# Patient Record
Sex: FEMALE | Race: WHITE | NOT HISPANIC OR LATINO | Employment: PART TIME | URBAN - METROPOLITAN AREA
[De-identification: names, ages, dates, MRNs, and addresses within clinical notes are randomized per-mention and may not be internally consistent; named-entity substitution may affect disease eponyms.]

---
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
## 2020-01-04 ENCOUNTER — TELEPHONE (OUTPATIENT)
Dept: PEDIATRICS CLINIC | Facility: CLINIC | Age: 20
End: 2020-01-04

## 2020-01-04 DIAGNOSIS — F41.1 GENERALIZED ANXIETY DISORDER: ICD-10-CM

## 2020-01-04 DIAGNOSIS — F41.0 PANIC DISORDER WITHOUT AGORAPHOBIA: ICD-10-CM

## 2020-01-04 DIAGNOSIS — F41.9 ANXIETY: ICD-10-CM

## 2020-01-04 NOTE — TELEPHONE ENCOUNTER
Mom called and Elkin Coley needs a refill on both prescriptions  Generic for Zoloft and Quantisine  #[phone]

## 2020-01-06 ENCOUNTER — TELEPHONE (OUTPATIENT)
Dept: PEDIATRICS CLINIC | Facility: CLINIC | Age: 20
End: 2020-01-06

## 2020-01-06 RX ORDER — GUANFACINE 2 MG/1
1 TABLET, EXTENDED RELEASE ORAL
Qty: 90 TABLET | Refills: 0 | Status: SHIPPED | OUTPATIENT
Start: 2020-01-06 | End: 2020-03-30

## 2020-01-10 DIAGNOSIS — N94.6 DYSMENORRHEA: Primary | ICD-10-CM

## 2020-01-13 ENCOUNTER — OFFICE VISIT (OUTPATIENT)
Dept: PEDIATRICS CLINIC | Facility: CLINIC | Age: 20
End: 2020-01-13
Payer: COMMERCIAL

## 2020-01-13 VITALS
SYSTOLIC BLOOD PRESSURE: 106 MMHG | BODY MASS INDEX: 22.68 KG/M2 | TEMPERATURE: 98.2 F | HEIGHT: 63 IN | WEIGHT: 128 LBS | DIASTOLIC BLOOD PRESSURE: 70 MMHG

## 2020-01-13 DIAGNOSIS — R50.9 FEVER, UNSPECIFIED FEVER CAUSE: Primary | ICD-10-CM

## 2020-01-13 DIAGNOSIS — R68.89 FLU-LIKE SYMPTOMS: ICD-10-CM

## 2020-01-13 LAB — S PYO AG THROAT QL: NEGATIVE

## 2020-01-13 PROCEDURE — 3008F BODY MASS INDEX DOCD: CPT | Performed by: PEDIATRICS

## 2020-01-13 PROCEDURE — 87880 STREP A ASSAY W/OPTIC: CPT | Performed by: PEDIATRICS

## 2020-01-13 PROCEDURE — 1036F TOBACCO NON-USER: CPT | Performed by: PEDIATRICS

## 2020-01-13 PROCEDURE — 99213 OFFICE O/P EST LOW 20 MIN: CPT | Performed by: PEDIATRICS

## 2020-01-13 RX ORDER — NORETHINDRONE ACETATE AND ETHINYL ESTRADIOL AND FERROUS FUMARATE 1MG-20(21)
KIT ORAL
COMMUNITY
Start: 2020-01-13

## 2020-01-13 RX ORDER — NORETHINDRONE ACETATE AND ETHINYL ESTRADIOL AND FERROUS FUMARATE 1MG-20(21)
KIT ORAL
Qty: 84 TABLET | Refills: 1 | Status: SHIPPED | OUTPATIENT
Start: 2020-01-13 | End: 2020-10-30 | Stop reason: SDUPTHER

## 2020-01-13 RX ORDER — CEFUROXIME AXETIL 250 MG/1
TABLET ORAL
COMMUNITY
Start: 2019-10-24 | End: 2020-02-28 | Stop reason: ALTCHOICE

## 2020-01-13 NOTE — PROGRESS NOTES
Assessment/Plan:    poc strep negative, culture pending  Informed Crispin Vital that she likely has viral flu  To monitor, push fluids, rest, motrin and tylenol  She does not return to school until the end of the month  She will call or return to office if not improving  Crispin Vital verbalized understanding     Diagnoses and all orders for this visit:    Fever, unspecified fever cause  -     POCT rapid strepA  -     Cancel: Throat culture; Future  -     Throat culture    Flu-like symptoms    Other orders  -     cefuroxime (CEFTIN) 250 mg tablet  -     JUNEL FE 1/20 1-20 MG-MCG per tablet          Subjective:      Patient ID: Ashely Galarza is a 23 y o  female  Saturday achy body, fevers 103, still persisting today  Fatigue, little congestion  She is worried that she has the flu  The following portions of the patient's history were reviewed and updated as appropriate: allergies, current medications, past family history, past medical history, past social history, past surgical history and problem list     Review of Systems      Objective:      /70 (BP Location: Left arm, Patient Position: Sitting, Cuff Size: Adult)   Temp 98 2 °F (36 8 °C) (Tympanic)   Ht 5' 2 5" (1 588 m)   Wt 58 1 kg (128 lb)   BMI 23 04 kg/m²          Physical Exam   Constitutional: She is oriented to person, place, and time  She appears well-developed and well-nourished  HENT:   Right Ear: External ear normal    Left Ear: External ear normal    Mouth/Throat: Posterior oropharyngeal erythema present  No oropharyngeal exudate or posterior oropharyngeal edema  Eyes: Pupils are equal, round, and reactive to light  Conjunctivae and EOM are normal    Neck: Normal range of motion  Cardiovascular: Normal rate and regular rhythm  Pulmonary/Chest: Effort normal and breath sounds normal    Abdominal: Soft  Bowel sounds are normal    Musculoskeletal: Normal range of motion  Lymphadenopathy:     She has cervical adenopathy     Neurological: She is alert and oriented to person, place, and time  Skin: Skin is warm and dry  Capillary refill takes less than 2 seconds  Psychiatric: She has a normal mood and affect  Her behavior is normal  Judgment and thought content normal    Nursing note and vitals reviewed

## 2020-01-13 NOTE — LETTER
January 13, 2020     Patient: Elliot Ortiz   YOB: 2000   Date of Visit: 1/13/2020       To Whom it May Concern:    Carrillo Gary is under my professional care  She was seen in my office on 1/13/2020  She has been diagnosed with flu and will be absent from work until she is fever free  If you have any questions or concerns, please don't hesitate to call           Sincerely,          LUI Forman        CC: No Recipients

## 2020-01-15 ENCOUNTER — TELEPHONE (OUTPATIENT)
Dept: OTHER | Facility: OTHER | Age: 20
End: 2020-01-15

## 2020-01-15 LAB — B-HEM STREP SPEC QL CULT: NEGATIVE

## 2020-01-16 NOTE — TELEPHONE ENCOUNTER
Keysha Appl 2000  CONFIDENTIALTY NOTICE: This fax transmission is intended only for the addressee  It contains information that is legally privileged,  confidential or otherwise protected from use or disclosure  If you are not the intended recipient, you are strictly prohibited from reviewing,  disclosing, copying using or disseminating any of this information or taking any action in reliance on or regarding this information  If you have  received this fax in error, please notify us immediately by telephone so that we can arrange for its return to us  Page: 1 of 3  Call Id: [id number]  Health Call  Standard Call Report  Health Call  Patient Name: Keysha Guthrie  Gender: Female  : 2000  Age: 23 Y 6 M 24 D  Return Phone  Number: [phone] (Current)  Address: [address] [place] [address]   City/State/Zip: [facility]  Practice Name: Mich[facility]  Practice Charged:  Physician:  [facility] Name: Angeles Fuentes  Relationship To  Patient: Mother  Return Phone Number: [phone] (Current)  Presenting Problem: "My daughter has the flu and she has a  temp if 103 7 oral "  Service Type: Triage  Charged Service 1: N/A  Pharmacy Name and  Number:  Nurse Assessment  Nurse: Faby Man RN Date/Time: 1/15/2020 7:19:58 PM  Type of assessment required:  ---General (Adult or Child)  Duration of Current S/S  ---Since Saturday  Location/Radiation  ---Nose  Temperature (F) and route:  ---103 2 (oral)  Symptom Specific Meds (Dose/Time): Will take Tylenol right now   ---ibuprofen 2 tablets LD: 1900  Other S/S  ---Ongoing moderately high fevers since Saturday  Body aches and a runny nose  No  cough  No difficulty breathing  Is concerned that fevers have been present for this long    Pain Scale on scale of 1-10, 10 being the worst:  ---No pain  Symptom progression:  ---same  Intake and Output  ---Drinking lots of fluids / Urinating normally  Keysha Appl 2000  CONFIDENTIALTY NOTICE: This fax transmission is intended only for the addressee  It contains information that is legally privileged,  confidential or otherwise protected from use or disclosure  If you are not the intended recipient, you are strictly prohibited from reviewing,  disclosing, copying using or disseminating any of this information or taking any action in reliance on or regarding this information  If you have  received this fax in error, please notify us immediately by telephone so that we can arrange for its return to us  Page: 2 of 3  Call Id: [id number]  Nurse Assessment  LMP/ Pregnancy:  ---n/a  Breastfeeding  ---No  Last Exam/Treatment:  ---In the office on Monday and diagnosed with the Flu  Protocols  Protocol Title Nurse Date/Time  Influenza (Flu) - Seasonal DEONTE Castillo, Tim Pouch 1/15/2020 7:23:47 PM  Question Caller Affirmed  Disp  Time Disposition Final User  1/15/2020 7:34:02 PM See Physician within [facility] Rashida Alan RN, Tim Jung  1/15/2020 7:35:02 PM RN Triaged Yes Arjun Azul RN, [facility] Advice Given Per Protocol  SEE PHYSICIAN WITHIN 24 HOURS: * IF OFFICE WILL BE OPEN: Your child needs to be examined within the next 24 hours  Call your child's doctor when the office opens, and make an appointment  FEVER MEDICINE AND TREATMENT: * For fever above  102 F (39 C) or aches, use acetaminophen OR ibuprofen (See Dosage table)  * AVOID ASPIRIN because of the strong link with Reye  syndrome  * FOR ALL FEVERS: Give cool fluids in unlimited amounts (Exception: less than 6 months old)  Dress in 1 layer of lightweight  clothing and sleep with 1 light blanket  (Avoid bundling)  Reason: overheated infants can't undress themselves  For fevers 100-102  F (37 8 to 39 C), this is the only treatment needed  Fever medicines are unnecessary  NASAL SALINE TO OPEN A BLOCKED NOSE:  * Use saline (salt water) nose drops or spray to loosen up the dried mucus  If you don't have saline, you can use a few drops of bottled  water or clean tap water   (If under 3year old, use bottled water or boiled tap water ) * STEP 1: Put 3 drops in each nostril  (Age under 3 year old, use 1 drop ) * STEP 2: Blow (or suction) each nostril separately, while closing off the other nostril  Then do other side  * STEP  3: Repeat nose drops and blowing (or suctioning) until the discharge is clear  * How Often: Do nasal saline when your child can't breathe  through the nose  Limit: If under 3year old, no more than 4 times per day or before every feeding  * Saline nose drops or spray can be  bought in any drugstore  No prescription is needed  * Saline nose drops can also be made at home  Use 1/2 teaspoon (2 ml) of table salt  Stir the salt into 1 cup (8 ounces or 240 ml) of warm water  Use bottled water or boiled water to make saline nose drops  * Reason for  nose drops: Suction or blowing alone can't remove dried or sticky mucus  Also, babies can't nurse or drink from a bottle unless the nose is  open  * Other option: use a warm shower to loosen mucus  Breathe in the moist air, then blow (or suction) each nostril  HUMIDIFIER: *  If the air in your home is dry, use a humidifier  * Moist air keeps the nasal mucus from drying up  CALL BACK IF * Rapid or difficulty  breathing develops * Your child becomes worse CARE ADVICE given per Influenza (Flu) - Seasonal (Pediatric) guideline  Caller Understands: Yes  Caller Disagree/Comply: Comply  PreDisposition: Unsure  Comments  User: Alba Skinner RN Date/Time: 1/15/2020 7:34:34 PM  Carrillo Gary 2000  CONFIDENTIALTY NOTICE: This fax transmission is intended only for the addressee  It contains information that is legally privileged,  confidential or otherwise protected from use or disclosure  If you are not the intended recipient, you are strictly prohibited from reviewing,  disclosing, copying using or disseminating any of this information or taking any action in reliance on or regarding this information   If you have  received this fax in error, please notify us immediately by telephone so that we can arrange for its return to us  Page: 3 of 3  Call Id: [id number]  Comments  Spoke to patient and with mom  Patient gave consent to speak to her mother Beula Clonts when needed  User: Germán Wu RN Date/Time: 1/15/2020 7:34:57 PM  Prefers calling the office tomorrow to schedule an appointment

## 2020-02-04 ENCOUNTER — TELEPHONE (OUTPATIENT)
Dept: PEDIATRICS CLINIC | Facility: CLINIC | Age: 20
End: 2020-02-04

## 2020-02-04 NOTE — TELEPHONE ENCOUNTER
The message was that last night Antione was throwing up and having anxiety and panic attacks she is complaining of chest pain I called at 1:50 p m  And left a message that I was returning the call

## 2020-02-28 ENCOUNTER — OFFICE VISIT (OUTPATIENT)
Dept: PEDIATRICS CLINIC | Facility: CLINIC | Age: 20
End: 2020-02-28
Payer: COMMERCIAL

## 2020-02-28 VITALS
DIASTOLIC BLOOD PRESSURE: 68 MMHG | WEIGHT: 122.6 LBS | HEART RATE: 68 BPM | BODY MASS INDEX: 22.56 KG/M2 | TEMPERATURE: 101 F | RESPIRATION RATE: 18 BRPM | HEIGHT: 62 IN | SYSTOLIC BLOOD PRESSURE: 112 MMHG

## 2020-02-28 DIAGNOSIS — J01.00 ACUTE NON-RECURRENT MAXILLARY SINUSITIS: Primary | ICD-10-CM

## 2020-02-28 PROCEDURE — 1036F TOBACCO NON-USER: CPT | Performed by: NURSE PRACTITIONER

## 2020-02-28 PROCEDURE — 99214 OFFICE O/P EST MOD 30 MIN: CPT | Performed by: NURSE PRACTITIONER

## 2020-02-28 PROCEDURE — 3008F BODY MASS INDEX DOCD: CPT | Performed by: NURSE PRACTITIONER

## 2020-02-28 RX ORDER — AMOXICILLIN 875 MG/1
875 TABLET, COATED ORAL 2 TIMES DAILY
Qty: 20 TABLET | Refills: 0 | Status: SHIPPED | OUTPATIENT
Start: 2020-02-28 | End: 2020-03-09

## 2020-02-28 NOTE — PROGRESS NOTES
Assessment/Plan:    No problem-specific Assessment & Plan notes found for this encounter  Diagnoses and all orders for this visit:    Acute non-recurrent maxillary sinusitis  -     amoxicillin (AMOXIL) 875 mg tablet; Take 1 tablet (875 mg total) by mouth 2 (two) times a day for 10 days      discussed with patient that based on symptoms and exam she is most likely suffering from a sinus infection and will treat her with amoxicillin  Should also try Flonase nasal spray 1 spray in each nostril once daily, saline nasal spray few times per day, and run cool mist humidifier at night while she is sleeping  May use ibuprofen or acetaminophen as needed for discomfort  Encouraged her to drink plenty of fluids  If symptoms worsen or do not improve by next week, call office for possible follow-up appointment  Patient verbalized understanding  Subjective:      Patient ID: Thomas Tolbert is a 21 y o  female  Started Tuesday with cold symptoms, worsening congestion last night, last dose of advil at 10pm, fever this morning, tmax 101F, lots of post-nasal drip, no cough, no GI symptoms noted, eating and drinking okay, not sleeping well, no other symptoms noted, attends [employer] [facility]    Sinus Problem   Associated symptoms include congestion, headaches and sinus pressure  Fever   Associated symptoms include congestion, fatigue and headaches  Pertinent negatives include no fever  Headache    Associated symptoms include sinus pressure  Pertinent negatives include no fever  The following portions of the patient's history were reviewed and updated as appropriate: allergies, current medications, past family history, past medical history, past social history, past surgical history and problem list     Review of Systems   Constitutional: Positive for activity change and fatigue  Negative for fever  HENT: Positive for congestion, sinus pressure and sinus pain  Respiratory: Negative      Cardiovascular: Negative  Gastrointestinal: Negative  Neurological: Positive for headaches  Objective:      /68 (BP Location: Left arm, Patient Position: Sitting, Cuff Size: Adult)   Pulse 68   Temp (!) 101 °F (38 3 °C) (Tympanic)   Resp 18   Ht 5' 2" (1 575 m)   Wt 55 6 kg (122 lb 9 6 oz)   BMI 22 42 kg/m²          Physical Exam   Constitutional: Vital signs are normal  She appears well-developed and well-nourished  She is cooperative  HENT:   Head: Normocephalic and atraumatic  Right Ear: Hearing, tympanic membrane, external ear and ear canal normal    Left Ear: Hearing, tympanic membrane, external ear and ear canal normal    Nose: Sinus tenderness present  Right sinus exhibits maxillary sinus tenderness  Left sinus exhibits maxillary sinus tenderness  Mouth/Throat: Oropharynx is clear and moist    Moderate nasal congestion noted   Neck: Normal range of motion  Neck supple  Cardiovascular: Normal rate, regular rhythm, S1 normal, S2 normal and normal heart sounds  Pulmonary/Chest: Effort normal and breath sounds normal    Neurological: She is alert  Skin: Skin is warm  Capillary refill takes less than 2 seconds  Nursing note and vitals reviewed

## 2020-03-28 DIAGNOSIS — F41.9 ANXIETY: ICD-10-CM

## 2020-03-28 DIAGNOSIS — F41.1 GENERALIZED ANXIETY DISORDER: ICD-10-CM

## 2020-03-28 DIAGNOSIS — F41.0 PANIC DISORDER WITHOUT AGORAPHOBIA: ICD-10-CM

## 2020-03-30 RX ORDER — GUANFACINE 2 MG/1
1 TABLET, EXTENDED RELEASE ORAL
Qty: 90 TABLET | Refills: 0 | Status: SHIPPED | OUTPATIENT
Start: 2020-03-30 | End: 2020-05-11 | Stop reason: SDUPTHER

## 2020-05-11 ENCOUNTER — OFFICE VISIT (OUTPATIENT)
Dept: PEDIATRICS CLINIC | Facility: CLINIC | Age: 20
End: 2020-05-11
Payer: COMMERCIAL

## 2020-05-11 VITALS
TEMPERATURE: 97.8 F | BODY MASS INDEX: 22.42 KG/M2 | HEART RATE: 66 BPM | RESPIRATION RATE: 16 BRPM | SYSTOLIC BLOOD PRESSURE: 120 MMHG | HEIGHT: 62 IN | DIASTOLIC BLOOD PRESSURE: 68 MMHG

## 2020-05-11 DIAGNOSIS — Z13.31 ENCOUNTER FOR SCREENING FOR DEPRESSION: ICD-10-CM

## 2020-05-11 DIAGNOSIS — F41.1 GENERALIZED ANXIETY DISORDER: ICD-10-CM

## 2020-05-11 DIAGNOSIS — F41.0 PANIC DISORDER WITHOUT AGORAPHOBIA: ICD-10-CM

## 2020-05-11 PROCEDURE — 99214 OFFICE O/P EST MOD 30 MIN: CPT | Performed by: PEDIATRICS

## 2020-05-11 PROCEDURE — 96168 HLTH BHV IVNTJ FAM EA ADDL: CPT | Performed by: PEDIATRICS

## 2020-05-11 PROCEDURE — 96156 HLTH BHV ASSMT/REASSESSMENT: CPT | Performed by: PEDIATRICS

## 2020-05-11 PROCEDURE — 1036F TOBACCO NON-USER: CPT | Performed by: PEDIATRICS

## 2020-05-11 RX ORDER — GUANFACINE 2 MG/1
1 TABLET, EXTENDED RELEASE ORAL
Qty: 90 TABLET | Refills: 0 | Status: SHIPPED | OUTPATIENT
Start: 2020-05-11 | End: 2020-08-19 | Stop reason: ALTCHOICE

## 2020-06-15 ENCOUNTER — TELEPHONE (OUTPATIENT)
Dept: PEDIATRICS CLINIC | Facility: CLINIC | Age: 20
End: 2020-06-15

## 2020-06-15 DIAGNOSIS — N94.6 DYSMENORRHEA: ICD-10-CM

## 2020-06-15 DIAGNOSIS — N94.6 DYSMENORRHEA: Primary | ICD-10-CM

## 2020-06-15 RX ORDER — NORETHINDRONE ACETATE AND ETHINYL ESTRADIOL AND FERROUS FUMARATE 1MG-20(21)
KIT ORAL
Qty: 84 TABLET | Refills: 1 | OUTPATIENT
Start: 2020-06-15

## 2020-06-15 RX ORDER — NORETHINDRONE ACETATE AND ETHINYL ESTRADIOL 1MG-20(21)
1 KIT ORAL DAILY
Qty: 28 TABLET | Refills: 1 | Status: SHIPPED | OUTPATIENT
Start: 2020-06-15 | End: 2020-07-14

## 2020-07-01 ENCOUNTER — TELEPHONE (OUTPATIENT)
Dept: PEDIATRICS CLINIC | Facility: CLINIC | Age: 20
End: 2020-07-01

## 2020-07-01 DIAGNOSIS — F41.0 PANIC DISORDER WITHOUT AGORAPHOBIA: ICD-10-CM

## 2020-07-01 DIAGNOSIS — F41.1 GENERALIZED ANXIETY DISORDER: ICD-10-CM

## 2020-07-01 NOTE — TELEPHONE ENCOUNTER
Marilee Dawn needs refills on 2 medications:    Zoloft 50 mg  Quanfacine 2 mg  Please send to [facility]  Mom advised will be done tomorrow

## 2020-07-02 DIAGNOSIS — F41.0 PANIC DISORDER WITHOUT AGORAPHOBIA: ICD-10-CM

## 2020-07-02 DIAGNOSIS — F41.1 GENERALIZED ANXIETY DISORDER: ICD-10-CM

## 2020-07-02 RX ORDER — GUANFACINE 2 MG/1
1 TABLET, EXTENDED RELEASE ORAL
Qty: 30 TABLET | Refills: 0 | Status: SHIPPED | OUTPATIENT
Start: 2020-07-02 | End: 2020-10-19

## 2020-07-02 NOTE — TELEPHONE ENCOUNTER
Notified mom that prescription was sent  Will reschedule appointment so that it will be scheduled with me for appropriate follow up

## 2020-07-08 DIAGNOSIS — N94.6 DYSMENORRHEA: ICD-10-CM

## 2020-07-14 DIAGNOSIS — N94.6 DYSMENORRHEA: ICD-10-CM

## 2020-07-14 RX ORDER — NORETHINDRONE ACETATE AND ETHINYL ESTRADIOL AND FERROUS FUMARATE 1MG-20(21)
KIT ORAL
Qty: 84 TABLET | Refills: 1 | OUTPATIENT
Start: 2020-07-14

## 2020-07-14 RX ORDER — NORETHINDRONE ACETATE AND ETHINYL ESTRADIOL AND FERROUS FUMARATE 1MG-20(21)
KIT ORAL
Qty: 28 TABLET | Refills: 0 | Status: SHIPPED | OUTPATIENT
Start: 2020-07-14 | End: 2020-10-30 | Stop reason: SDUPTHER

## 2020-07-27 ENCOUNTER — TELEPHONE (OUTPATIENT)
Dept: PEDIATRICS CLINIC | Facility: CLINIC | Age: 20
End: 2020-07-27

## 2020-07-27 DIAGNOSIS — N94.6 DYSMENORRHEA: Primary | ICD-10-CM

## 2020-07-27 RX ORDER — NORETHINDRONE ACETATE AND ETHINYL ESTRADIOL 1MG-20(21)
1 KIT ORAL DAILY
Qty: 28 TABLET | Refills: 0 | Status: SHIPPED | OUTPATIENT
Start: 2020-07-27 | End: 2020-10-30 | Stop reason: SDUPTHER

## 2020-07-27 NOTE — TELEPHONE ENCOUNTER
Patient has appointment in early August so 1 more prescription has been sent  Will need to have follow-up prior to further prescriptions being sent

## 2020-07-27 NOTE — TELEPHONE ENCOUNTER
[facility] needs a refill for her Junel FE Please send to [facility], [employer]

## 2020-08-04 ENCOUNTER — TELEPHONE (OUTPATIENT)
Dept: PEDIATRICS CLINIC | Facility: CLINIC | Age: 20
End: 2020-08-04

## 2020-08-04 DIAGNOSIS — N94.6 DYSMENORRHEA: Primary | ICD-10-CM

## 2020-08-04 RX ORDER — NORETHINDRONE ACETATE AND ETHINYL ESTRADIOL 1MG-20(21)
1 KIT ORAL DAILY
Qty: 28 TABLET | Refills: 0 | Status: SHIPPED | OUTPATIENT
Start: 2020-08-04 | End: 2020-10-30 | Stop reason: ALTCHOICE
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
## 2020-08-04 NOTE — TELEPHONE ENCOUNTER
Prescription for 1 month was sent  Patient needs to schedule a full gyn visit  Will need to be seen prior to next refill

## 2020-08-07 ENCOUNTER — OFFICE VISIT (OUTPATIENT)
Dept: PEDIATRICS CLINIC | Facility: CLINIC | Age: 20
End: 2020-08-07
Payer: COMMERCIAL

## 2020-08-07 VITALS
TEMPERATURE: 97.1 F | WEIGHT: 130 LBS | SYSTOLIC BLOOD PRESSURE: 110 MMHG | BODY MASS INDEX: 23.92 KG/M2 | HEIGHT: 62 IN | DIASTOLIC BLOOD PRESSURE: 64 MMHG

## 2020-08-07 DIAGNOSIS — F41.0 PANIC DISORDER: ICD-10-CM

## 2020-08-07 DIAGNOSIS — F41.1 GENERALIZED ANXIETY DISORDER: Primary | ICD-10-CM

## 2020-08-07 PROCEDURE — 1036F TOBACCO NON-USER: CPT | Performed by: PEDIATRICS

## 2020-08-07 PROCEDURE — 99214 OFFICE O/P EST MOD 30 MIN: CPT | Performed by: PEDIATRICS

## 2020-08-07 PROCEDURE — 3725F SCREEN DEPRESSION PERFORMED: CPT | Performed by: PEDIATRICS

## 2020-08-07 NOTE — PROGRESS NOTES
Assessment/Plan:    No problem-specific Assessment & Plan notes found for this encounter  Discussed history and physical exam with Debbie Arroyo and her mother  Reviewed the SCARED and PHQ-9 completed by by Debbie Arroyo today  Reassurance given that is not unexpected that she would be feeling more anxious at this time, with the [place] pandemic  Reassurance given that her scores have shown no significant changes and recommend no changes in her medications, but recommended follow up in 3 months, sooner if she has any concerns  P/MVUI  Diagnoses and all orders for this visit:    Generalized anxiety disorder    Panic disorder    Other orders  -     Cancel: HPV VACCINE 9 VALENT IM          Subjective:      Patient ID: Carlos Hackett is a 21 y o  female  School is on line for the fall  Debbie Arroyo is feeling more anxious due to COVID  Mom is working irregular and long shifts and is around at risk people in close quarters  The following portions of the patient's history were reviewed and updated as appropriate: allergies, current medications, past family history, past medical history, past social history, past surgical history and problem list     Review of Systems   All other systems reviewed and are negative  Objective:      /64 (BP Location: Left arm, Patient Position: Sitting, Cuff Size: Adult)   Temp (!) 97 1 °F (36 2 °C) (Temporal)   Ht 5' 2" (1 575 m)   Wt 59 kg (130 lb)   BMI 23 78 kg/m²          Physical Exam  Vitals signs and nursing note reviewed  Constitutional:       Appearance: Normal appearance  She is well-developed and normal weight  HENT:      Head: Normocephalic and atraumatic  Right Ear: Tympanic membrane, ear canal and external ear normal       Left Ear: Tympanic membrane, ear canal and external ear normal       Nose: Nose normal       Mouth/Throat:      Mouth: Mucous membranes are moist       Pharynx: Oropharynx is clear     Neck:      Musculoskeletal: Normal range of motion and neck supple  Cardiovascular:      Rate and Rhythm: Normal rate and regular rhythm  Pulses: Normal pulses  Heart sounds: Normal heart sounds  No murmur  Pulmonary:      Effort: Pulmonary effort is normal       Breath sounds: Normal breath sounds  Lymphadenopathy:      Cervical: No cervical adenopathy  Neurological:      General: No focal deficit present  Mental Status: She is alert and oriented to person, place, and time  Psychiatric:         Mood and Affect: Mood normal          Behavior: Behavior normal          Thought Content:  Thought content normal          Judgment: Judgment normal

## 2020-08-13 DIAGNOSIS — N94.6 DYSMENORRHEA: ICD-10-CM

## 2020-08-13 RX ORDER — NORETHINDRONE ACETATE AND ETHINYL ESTRADIOL AND FERROUS FUMARATE 1MG-20(21)
KIT ORAL
Qty: 28 TABLET | Refills: 0 | OUTPATIENT
Start: 2020-08-13

## 2020-08-19 ENCOUNTER — OFFICE VISIT (OUTPATIENT)
Dept: PEDIATRICS CLINIC | Facility: CLINIC | Age: 20
End: 2020-08-19
Payer: COMMERCIAL

## 2020-08-19 VITALS
DIASTOLIC BLOOD PRESSURE: 70 MMHG | HEIGHT: 62 IN | HEART RATE: 81 BPM | BODY MASS INDEX: 24.11 KG/M2 | TEMPERATURE: 97.5 F | WEIGHT: 131 LBS | SYSTOLIC BLOOD PRESSURE: 106 MMHG | OXYGEN SATURATION: 99 %

## 2020-08-19 DIAGNOSIS — H61.22 IMPACTED CERUMEN OF LEFT EAR: Primary | ICD-10-CM

## 2020-08-19 PROCEDURE — 99214 OFFICE O/P EST MOD 30 MIN: CPT | Performed by: PEDIATRICS

## 2020-08-19 PROCEDURE — 3008F BODY MASS INDEX DOCD: CPT | Performed by: PEDIATRICS

## 2020-08-19 PROCEDURE — 1036F TOBACCO NON-USER: CPT | Performed by: PEDIATRICS

## 2020-08-19 RX ORDER — BIOTIN 10 MG
TABLET ORAL
COMMUNITY

## 2020-08-19 NOTE — PATIENT INSTRUCTIONS
Hydrogen Peroxide, using a dropper or syringe  Put several drops into the outer ear canal in the evening nightly until you can hear well and your ear feels better  Then use periodically as needed

## 2020-08-23 NOTE — PROGRESS NOTES
Assessment/Plan:    No problem-specific Assessment & Plan notes found for this encounter  Discussed history and physical exam with Jamil Llamas  Reviewed the impact of cerumen on hearing and ear discomfort  Explained that q-tips can push the wax further into the ear  Recommended the use of hydrogen peroxide to soften the wax and help it be sloughed naturally from the ear  After the application of colace and flushing with water, the cerumen (with a maricruz residue) was removed from her ears and Jamil Lillyen felt much better  RTO prn  PVUI  Diagnoses and all orders for this visit:    Impacted cerumen of left ear  -     Ear cerumen removal; Future    Other orders  -     Multiple Vitamins-Minerals (Multivitamin Adult) CHEW; Chew  -     Ascorbic Acid (VITAMIN C GUMMIES PO); Take by mouth          Subjective:      Patient ID: Lamin Pyle is a 21 y o  female  Charlene's left ear is uncomfortable and she is having difficulty hearing from it  She was recently at the beach and doing a lot of swimming  She is concerned about a swimmer's ear  She also had put a qtip in her ear and is worried that she may have hurt herself  The following portions of the patient's history were reviewed and updated as appropriate: allergies, current medications, past family history, past medical history, past social history, past surgical history and problem list     Review of Systems   All other systems reviewed and are negative  Objective:      /70 (BP Location: Left arm, Patient Position: Sitting, Cuff Size: Adult)   Pulse 81   Temp 97 5 °F (36 4 °C) (Temporal)   Ht 5' 2 25" (1 581 m)   Wt 59 4 kg (131 lb)   SpO2 99%   BMI 23 77 kg/m²          Physical Exam  Vitals signs and nursing note reviewed  Constitutional:       Appearance: Normal appearance  She is well-developed and normal weight  HENT:      Head: Normocephalic and atraumatic  Right Ear: Tympanic membrane and external ear normal  No tenderness   There is no impacted cerumen  Left Ear: Tympanic membrane and external ear normal  No tenderness  There is impacted cerumen (flushing removed a combination of cerumen and sand)  Nose: Nose normal       Mouth/Throat:      Mouth: Mucous membranes are moist       Pharynx: Oropharynx is clear  Eyes:      Extraocular Movements: Extraocular movements intact  Neck:      Musculoskeletal: Normal range of motion and neck supple  Cardiovascular:      Rate and Rhythm: Normal rate and regular rhythm  Pulses: Normal pulses  Heart sounds: Normal heart sounds  No murmur  Pulmonary:      Effort: Pulmonary effort is normal       Breath sounds: Normal breath sounds  Lymphadenopathy:      Cervical: No cervical adenopathy  Neurological:      General: No focal deficit present  Mental Status: She is alert and oriented to person, place, and time  Psychiatric:         Mood and Affect: Mood normal          Behavior: Behavior normal          Thought Content:  Thought content normal          Judgment: Judgment normal

## 2020-09-02 ENCOUNTER — OFFICE VISIT (OUTPATIENT)
Dept: PEDIATRICS CLINIC | Facility: CLINIC | Age: 20
End: 2020-09-02
Payer: COMMERCIAL

## 2020-09-02 VITALS
TEMPERATURE: 97.8 F | HEIGHT: 62 IN | WEIGHT: 131 LBS | BODY MASS INDEX: 24.11 KG/M2 | DIASTOLIC BLOOD PRESSURE: 64 MMHG | SYSTOLIC BLOOD PRESSURE: 106 MMHG

## 2020-09-02 DIAGNOSIS — Z30.09 ENCOUNTER FOR COUNSELING REGARDING CONTRACEPTION: ICD-10-CM

## 2020-09-02 DIAGNOSIS — Z30.41 ENCOUNTER FOR BIRTH CONTROL PILLS MAINTENANCE: Primary | ICD-10-CM

## 2020-09-02 DIAGNOSIS — Z01.411 ABNORMAL GYNECOLOGICAL EXAMINATION: ICD-10-CM

## 2020-09-02 DIAGNOSIS — N76.0 ACUTE VAGINITIS: ICD-10-CM

## 2020-09-02 PROCEDURE — 99214 OFFICE O/P EST MOD 30 MIN: CPT | Performed by: LICENSED PRACTICAL NURSE

## 2020-09-02 PROCEDURE — 1036F TOBACCO NON-USER: CPT | Performed by: LICENSED PRACTICAL NURSE

## 2020-09-02 RX ORDER — AMOXICILLIN AND CLAVULANATE POTASSIUM 875; 125 MG/1; MG/1
TABLET, FILM COATED ORAL
COMMUNITY
Start: 2020-08-19 | End: 2020-10-30 | Stop reason: ALTCHOICE

## 2020-09-02 RX ORDER — NORETHINDRONE ACETATE AND ETHINYL ESTRADIOL 1MG-20(21)
1 KIT ORAL DAILY
Qty: 84 TABLET | Refills: 3 | Status: SHIPPED | OUTPATIENT
Start: 2020-09-02 | End: 2021-08-02

## 2020-09-02 NOTE — PROGRESS NOTES
Assessment/Plan:    No problem-specific Assessment & Plan notes found for this encounter  Diagnoses and all orders for this visit:    Encounter for birth control pills maintenance  -     norethindrone-ethinyl estradiol (JUNEL FE 1/20) 1-20 MG-MCG per tablet; Take 1 tablet by mouth daily    Acute vaginitis    Abnormal gynecological examination    Encounter for counseling regarding contraception    Other orders  -     amoxicillin-clavulanate (AUGMENTIN) 875-125 mg per tablet        Discussed exam and advised on constipation  Suspect this is the cause of right side cramping discomfort, but if this persists or other symptoms, RTO  Will refill pills for the year and advised to seek care from OBGYN for next exam   Will send swabs for chlamydia, gonorrhea, Trichomonas, Candida and bacterial vaginosis and follow up with results to her cell phone  Reinforced using condoms with every sexual encounter  Should follow up as needed  Also should follow up if c/o ACHES  Cell # is [phone]  Patient verbalized understanding  Subjective:      Patient ID: Edmond Casillas is a 21 y o  female  No c/o with pill  No pain with intercourse  No concerns with discharge  Right side pain that is weird but hasn't had her BM  Feels like cramping  No intermenstrual bleeding  No bleeding with intercourse  See gyn history exam       The following portions of the patient's history were reviewed and updated as appropriate: allergies, current medications, past family history, past medical history, past social history, past surgical history and problem list     Review of Systems   Constitutional: Negative for activity change, appetite change and fever  Gastrointestinal: Negative for abdominal pain  Genitourinary: Positive for vaginal discharge  Negative for decreased urine volume, dyspareunia, flank pain, frequency, genital sores, urgency and vaginal pain           Objective:      /64 (BP Location: Left arm, Patient Position: Sitting, Cuff Size: Adult)   Temp 97 8 °F (36 6 °C) (Temporal)   Ht 5' 1 5" (1 562 m)   Wt 59 4 kg (131 lb)   BMI 24 35 kg/m²          Physical Exam  Vitals signs and nursing note reviewed  Exam conducted with a chaperone present (medical assistant)  Constitutional:       Appearance: Normal appearance  She is normal weight  Neck:      Musculoskeletal: Normal range of motion and neck supple  Thyroid: No thyromegaly  Cardiovascular:      Rate and Rhythm: Normal rate and regular rhythm  Pulses: Normal pulses  Heart sounds: Normal heart sounds  Pulmonary:      Effort: Pulmonary effort is normal       Breath sounds: Normal breath sounds  Abdominal:      General: Bowel sounds are normal  There is no distension  Palpations: Abdomen is soft  There is no mass  Tenderness: There is abdominal tenderness  Hernia: No hernia is present  There is no hernia in the left inguinal area or right inguinal area  Comments: Mild discomfort with deep palpation in right upper and lower quadrants, bu nto rebound, negative psoas  Genitourinary:     General: Normal vulva  Exam position: Supine  Pubic Area: No rash  Reggie stage (genital): 5  Labia:         Right: No rash  Left: No rash  Vagina: Vaginal discharge present  No tenderness or lesions  Cervix: No cervical motion tenderness, discharge, friability, lesion or erythema  Uterus: Normal        Adnexa:         Right: No tenderness  Left: No tenderness or fullness  Lymphadenopathy:      Cervical: No cervical adenopathy  Lower Body: No right inguinal adenopathy  No left inguinal adenopathy  Skin:     General: Skin is warm  Capillary Refill: Capillary refill takes less than 2 seconds  Neurological:      Mental Status: She is alert

## 2020-09-04 LAB
A VAGINAE DNA VAG QL NAA+PROBE: NORMAL SCORE
BVAB2 DNA VAG QL NAA+PROBE: NORMAL SCORE
C ALBICANS DNA VAG QL NAA+PROBE: NEGATIVE
C GLABRATA DNA VAG QL NAA+PROBE: NEGATIVE
C TRACH RRNA SPEC QL NAA+PROBE: NEGATIVE
MEGA1 DNA VAG QL NAA+PROBE: NORMAL SCORE
N GONORRHOEA RRNA SPEC QL NAA+PROBE: NEGATIVE
T VAGINALIS RRNA SPEC QL NAA+PROBE: NEGATIVE

## 2020-09-05 ENCOUNTER — TELEPHONE (OUTPATIENT)
Dept: PEDIATRICS CLINIC | Facility: CLINIC | Age: 20
End: 2020-09-05

## 2020-09-05 NOTE — TELEPHONE ENCOUNTER
Notified patient that labs swabs done with gynecologic exam were all negative  Patient verbalized understanding

## 2020-09-29 DIAGNOSIS — F41.9 ANXIETY: ICD-10-CM

## 2020-10-19 DIAGNOSIS — F41.0 PANIC DISORDER WITHOUT AGORAPHOBIA: ICD-10-CM

## 2020-10-19 DIAGNOSIS — F41.1 GENERALIZED ANXIETY DISORDER: ICD-10-CM

## 2020-10-19 RX ORDER — GUANFACINE 2 MG/1
TABLET, EXTENDED RELEASE ORAL
Qty: 90 TABLET | Refills: 0 | Status: SHIPPED | OUTPATIENT
Start: 2020-10-19 | End: 2021-01-06 | Stop reason: SDUPTHER

## 2020-10-30 ENCOUNTER — OFFICE VISIT (OUTPATIENT)
Dept: PEDIATRICS CLINIC | Facility: CLINIC | Age: 20
End: 2020-10-30
Payer: COMMERCIAL

## 2020-10-30 VITALS
WEIGHT: 133.6 LBS | OXYGEN SATURATION: 97 % | HEIGHT: 62 IN | TEMPERATURE: 98.6 F | BODY MASS INDEX: 24.59 KG/M2 | HEART RATE: 98 BPM | DIASTOLIC BLOOD PRESSURE: 74 MMHG | SYSTOLIC BLOOD PRESSURE: 124 MMHG

## 2020-10-30 DIAGNOSIS — L71.0 PERIORAL DERMATITIS: ICD-10-CM

## 2020-10-30 DIAGNOSIS — H92.01 RIGHT EAR PAIN: Primary | ICD-10-CM

## 2020-10-30 PROCEDURE — 99213 OFFICE O/P EST LOW 20 MIN: CPT | Performed by: NURSE PRACTITIONER

## 2020-10-30 PROCEDURE — 3008F BODY MASS INDEX DOCD: CPT | Performed by: NURSE PRACTITIONER

## 2020-12-22 DIAGNOSIS — F41.9 ANXIETY: ICD-10-CM

## 2020-12-22 NOTE — TELEPHONE ENCOUNTER
Ilana Conde needs an appointment  The pharmacy requested a refill, but she was supposed to have been seen in November

## 2021-01-06 ENCOUNTER — TELEPHONE (OUTPATIENT)
Dept: PEDIATRICS CLINIC | Facility: CLINIC | Age: 21
End: 2021-01-06

## 2021-01-06 DIAGNOSIS — F41.9 ANXIETY: ICD-10-CM

## 2021-01-06 DIAGNOSIS — F41.1 GENERALIZED ANXIETY DISORDER: ICD-10-CM

## 2021-01-06 DIAGNOSIS — F41.0 PANIC DISORDER WITHOUT AGORAPHOBIA: ICD-10-CM

## 2021-01-06 RX ORDER — GUANFACINE 2 MG/1
1 TABLET, EXTENDED RELEASE ORAL
Qty: 90 TABLET | Refills: 1 | Status: SHIPPED | OUTPATIENT
Start: 2021-01-06 | End: 2021-04-06

## 2021-01-11 ENCOUNTER — OFFICE VISIT (OUTPATIENT)
Dept: PEDIATRICS CLINIC | Facility: CLINIC | Age: 21
End: 2021-01-11
Payer: COMMERCIAL

## 2021-01-11 VITALS
WEIGHT: 131 LBS | TEMPERATURE: 97.6 F | SYSTOLIC BLOOD PRESSURE: 120 MMHG | BODY MASS INDEX: 24.11 KG/M2 | DIASTOLIC BLOOD PRESSURE: 70 MMHG | OXYGEN SATURATION: 99 % | HEART RATE: 85 BPM | HEIGHT: 62 IN

## 2021-01-11 DIAGNOSIS — Z23 ENCOUNTER FOR IMMUNIZATION: ICD-10-CM

## 2021-01-11 DIAGNOSIS — Z00.00 ANNUAL PHYSICAL EXAM: ICD-10-CM

## 2021-01-11 DIAGNOSIS — Z00.129 ENCOUNTER FOR ROUTINE CHILD HEALTH EXAMINATION WITHOUT ABNORMAL FINDINGS: Primary | ICD-10-CM

## 2021-01-11 DIAGNOSIS — Z01.00 ENCOUNTER FOR VISION SCREENING: ICD-10-CM

## 2021-01-11 DIAGNOSIS — F41.1 GENERALIZED ANXIETY DISORDER: ICD-10-CM

## 2021-01-11 DIAGNOSIS — Z01.10 ENCOUNTER FOR HEARING EXAMINATION WITHOUT ABNORMAL FINDINGS: ICD-10-CM

## 2021-01-11 PROCEDURE — 99395 PREV VISIT EST AGE 18-39: CPT | Performed by: PEDIATRICS

## 2021-01-11 PROCEDURE — 3008F BODY MASS INDEX DOCD: CPT | Performed by: PEDIATRICS

## 2021-01-11 PROCEDURE — 92551 PURE TONE HEARING TEST AIR: CPT | Performed by: PEDIATRICS

## 2021-01-11 PROCEDURE — 99173 VISUAL ACUITY SCREEN: CPT | Performed by: PEDIATRICS

## 2021-01-11 NOTE — PATIENT INSTRUCTIONS

## 2021-01-11 NOTE — PROGRESS NOTES
Assessment/Plan:    No problem-specific Assessment & Plan notes found for this encounter  Discussed history and physical exam with Crispin Vital  Reviewed the PHQ-9 and SCARED that she completed today  Recommended continued use of guanfacine and sertraline  Encouraged Crispin Vital to find another physician to manage her medication now that she has gotten older  PVUI  Diagnoses and all orders for this visit:    Encounter for routine child health examination without abnormal findings    Encounter for immunization    Encounter for hearing examination without abnormal findings    Encounter for vision screening    Annual physical exam    Generalized anxiety disorder    Other orders  -     Cancel: MENINGOCOCCAL B RECOMBINANT          Subjective:      Patient ID: Ashely Galarza is a 21 y o  female  Crispin Vital is here for follow up of her mental health concerns  Crispin Vital states she is doing well on her current dose  She is advancing well in school  She is able do all of her daily function and work without difficulty  The following portions of the patient's history were reviewed and updated as appropriate: allergies, current medications, past family history, past medical history, past social history, past surgical history and problem list     Review of Systems   All other systems reviewed and are negative  Objective:      /70 (BP Location: Left arm, Patient Position: Sitting, Cuff Size: Adult)   Pulse 85   Temp 97 6 °F (36 4 °C) (Temporal)   Ht 5' 2" (1 575 m)   Wt 59 4 kg (131 lb)   SpO2 99%   BMI 23 96 kg/m²          Physical Exam  Vitals signs and nursing note reviewed  Constitutional:       Appearance: Normal appearance  She is well-developed and normal weight  HENT:      Head: Normocephalic and atraumatic        Right Ear: Tympanic membrane, ear canal and external ear normal       Left Ear: Tympanic membrane, ear canal and external ear normal       Nose: Nose normal       Mouth/Throat:      Mouth: Mucous membranes are moist       Pharynx: Oropharynx is clear  Eyes:      Extraocular Movements: Extraocular movements intact  Neck:      Musculoskeletal: Normal range of motion and neck supple  Cardiovascular:      Rate and Rhythm: Normal rate and regular rhythm  Pulses: Normal pulses  Heart sounds: Normal heart sounds  No murmur  Pulmonary:      Effort: Pulmonary effort is normal       Breath sounds: Normal breath sounds  Lymphadenopathy:      Cervical: No cervical adenopathy  Neurological:      General: No focal deficit present  Mental Status: She is alert and oriented to person, place, and time  Psychiatric:         Mood and Affect: Mood normal          Behavior: Behavior normal          Thought Content:  Thought content normal          Judgment: Judgment normal

## 2021-01-11 NOTE — PROGRESS NOTES
[facility]    NAME: Thomas Tolbert  AGE: 21 y o  SEX: female  : 2000     DATE: 2021     Assessment and Plan:     Problem List Items Addressed This Visit     None      Visit Diagnoses     Encounter for immunization    -  Primary    Encounter for hearing examination without abnormal findings        Encounter for vision screening        Encounter for routine child health examination without abnormal findings        Annual physical exam              Immunizations and preventive care screenings were discussed with patient today  Appropriate education was printed on patient's after visit summary  Return if symptoms worsen or fail to improve  Chief Complaint:     Chief Complaint   Patient presents with    Well Child     21 year well check       History of Present Illness:     Adult Annual Physical   Patient here for a comprehensive physical exam  The patient reports problems - follow up of mental health issues: anxiety and depression  Diet and Physical Activity  · Diet/Nutrition: well balanced diet  · Exercise: no formal exercise  Depression Screening  PHQ-9 Depression Screening    PHQ-9:   Frequency of the following problems over the past two weeks:           General Health  · Sleep: gets 7-8 hours of sleep on average and knows her sleep hygiene is not what it should be, discussed how to improve  · Hearing: no concerns  · Vision: goes for regular eye exams and wears contacts  · Dental: regular dental visits, brushes teeth twice daily and next appointment is nextmonth  /GYN Health  · Last menstrual period: due this week, probably tomorrow  · Contraceptive method: oral contraceptives  · History of STDs?: no      Review of Systems:     Review of Systems   All other systems reviewed and are negative       Past Medical History:     Past Medical History:   Diagnosis Date    Anxiety     Otitis media     Pneumonia     Urinary tract infection       Past Surgical History:     Past Surgical History:   Procedure Laterality Date    WISDOM TOOTH EXTRACTION        Social History:     E-Cigarette/Vaping    E-Cigarette Use Never User      E-Cigarette/Vaping Substances    Nicotine No     THC No     CBD No     Flavoring No     Other No     Unknown No      Social History     Socioeconomic History    Marital status: Unknown     Spouse name: None    Number of children: None    Years of education: None    Highest education level: None   Occupational History    None   Social Needs    Financial resource strain: None    Food insecurity     Worry: None     Inability: None    Transportation needs     Medical: None     Non-medical: None   Tobacco Use    Smoking status: Never Smoker    Smokeless tobacco: Never Used   Substance and Sexual Activity    Alcohol use: Yes     Frequency: Monthly or less     Drinks per session: 1 or 2    Drug use: Never    Sexual activity: Yes     Partners: Male     Birth control/protection: Emergency Contraception, OCP   Lifestyle    Physical activity     Days per week: 2 days     Minutes per session: 30 min    Stress: None   Relationships    Social connections     Talks on phone: None     Gets together: None     Attends Tenriism service: None     Active member of club or organization: None     Attends meetings of clubs or organizations: None     Relationship status: None    Intimate partner violence     Fear of current or ex partner: None     Emotionally abused: None     Physically abused: None     Forced sexual activity: None   Other Topics Concern    None   Social History Narrative    None      Family History:     Family History   Problem Relation Age of Onset    No Known Problems Mother     Suicide Attempts Father     Depression Father     Cancer Maternal Grandmother     Depression Maternal Grandmother     Anxiety disorder Maternal Grandmother     Hypertension Paternal Grandmother     Cancer Paternal Grandmother     Asthma Paternal Grandmother     Anxiety disorder Paternal Grandmother     Depression Paternal Grandfather     Hypertension Paternal Grandfather       Current Medications:     Current Outpatient Medications   Medication Sig Dispense Refill    Ascorbic Acid (VITAMIN C GUMMIES PO) Take by mouth      clindamycin (CLINDAGEL) 1 % gel Apply 1 application topically 2 (two) times a day  4    guanFACINE HCl ER 2 MG TB24 Take 1 tablet (2 mg total) by mouth daily at bedtime 90 tablet 1    Multiple Vitamins-Minerals (Multivitamin Adult) CHEW Chew      norethindrone-ethinyl estradiol (JUNEL FE 1/20) 1-20 MG-MCG per tablet Take 1 tablet by mouth daily 84 tablet 3    sertraline (ZOLOFT) 50 mg tablet Take 1 tablet (50 mg total) by mouth daily 90 tablet 1    tretinoin (REFISSA) 0 05 % cream APPLY TO FACE AT BEDTIME  4    JUNEL FE 1/20 1-20 MG-MCG per tablet       tretinoin (RETIN-A) 0 025 % cream APPLY TO AFFECTED AREA EVERY EVENING  4     No current facility-administered medications for this visit  Allergies:     No Known Allergies   Physical Exam:     /70 (BP Location: Left arm, Patient Position: Sitting, Cuff Size: Adult)   Pulse 85   Temp 97 6 °F (36 4 °C) (Temporal)   Ht 5' 2" (1 575 m)   Wt 59 4 kg (131 lb)   SpO2 99%   BMI 23 96 kg/m²     Physical Exam  Vitals signs and nursing note reviewed  Exam conducted with a chaperone present  Constitutional:       Appearance: Normal appearance  She is normal weight  HENT:      Head: Normocephalic and atraumatic  Right Ear: Tympanic membrane, ear canal and external ear normal       Left Ear: Tympanic membrane, ear canal and external ear normal       Nose: Nose normal       Mouth/Throat:      Mouth: Mucous membranes are moist       Pharynx: Oropharynx is clear  Eyes:      Extraocular Movements: Extraocular movements intact        Conjunctiva/sclera: Conjunctivae normal       Pupils: Pupils are equal, round, and reactive to light  Neck:      Musculoskeletal: Normal range of motion and neck supple  Cardiovascular:      Rate and Rhythm: Normal rate and regular rhythm  Pulses: Normal pulses  Heart sounds: Normal heart sounds  Pulmonary:      Effort: Pulmonary effort is normal  No respiratory distress  Breath sounds: Normal breath sounds  Abdominal:      General: Abdomen is flat  Bowel sounds are normal  There is no distension  Palpations: Abdomen is soft  There is no mass  Genitourinary:     General: Normal vulva  Comments: Reggie 5  Musculoskeletal: Normal range of motion  Skin:     General: Skin is warm and dry  Capillary Refill: Capillary refill takes less than 2 seconds  Neurological:      General: No focal deficit present  Mental Status: She is alert and oriented to person, place, and time  Psychiatric:         Mood and Affect: Mood normal          Behavior: Behavior normal          Thought Content:  Thought content normal          Judgment: Judgment normal           Miriam Driscoll MD   [facility]

## 2021-01-12 ENCOUNTER — TELEPHONE (OUTPATIENT)
Dept: PEDIATRICS CLINIC | Facility: CLINIC | Age: 21
End: 2021-01-12

## 2021-01-18 NOTE — TELEPHONE ENCOUNTER
01/18/21 10:42 AM     Thank you for your request  Your request has been received, reviewed, and the patient chart updated  The PCP has successfully been removed with a patient attribution note  This message will now be completed      Thank you  Shanell Rapp

## 2021-04-06 NOTE — TELEPHONE ENCOUNTER
[facility] called to request a new script be sent over for guanfacine 1 mg ER and she can take 2 at bedtime, this strength is not in the recall  Per pharmacist she still has a 30 day supply of the 2mg ER,at this time that lot # has not been recalled, but it changes every few days to adding another lot #  She wants to have a back up ready for her in case what she has is recalled  Please send new script to :    [id number]- [place] - [address]   Phone:  [phone]   Fax:  [phone]